# Patient Record
Sex: MALE | Race: WHITE | NOT HISPANIC OR LATINO | Employment: STUDENT | URBAN - METROPOLITAN AREA
[De-identification: names, ages, dates, MRNs, and addresses within clinical notes are randomized per-mention and may not be internally consistent; named-entity substitution may affect disease eponyms.]

---

## 2024-06-18 ENCOUNTER — OFFICE VISIT (OUTPATIENT)
Dept: PRIMARY CARE | Facility: CLINIC | Age: 19
End: 2024-06-18
Payer: COMMERCIAL

## 2024-06-18 VITALS
SYSTOLIC BLOOD PRESSURE: 115 MMHG | HEART RATE: 73 BPM | HEIGHT: 66 IN | OXYGEN SATURATION: 98 % | DIASTOLIC BLOOD PRESSURE: 84 MMHG | BODY MASS INDEX: 22.37 KG/M2 | TEMPERATURE: 97.2 F | WEIGHT: 139.2 LBS

## 2024-06-18 DIAGNOSIS — F90.8 ATTENTION DEFICIT HYPERACTIVITY DISORDER (ADHD), OTHER TYPE: ICD-10-CM

## 2024-06-18 DIAGNOSIS — Z00.00 ANNUAL PHYSICAL EXAM: Primary | ICD-10-CM

## 2024-06-18 DIAGNOSIS — Z79.899 MEDICATION MANAGEMENT: ICD-10-CM

## 2024-06-18 DIAGNOSIS — F41.9 ANXIETY: ICD-10-CM

## 2024-06-18 PROBLEM — F90.9 ADHD: Status: ACTIVE | Noted: 2024-06-18

## 2024-06-18 PROCEDURE — 99385 PREV VISIT NEW AGE 18-39: CPT | Performed by: NURSE PRACTITIONER

## 2024-06-18 PROCEDURE — 1036F TOBACCO NON-USER: CPT | Performed by: NURSE PRACTITIONER

## 2024-06-18 RX ORDER — HYDROXYZINE HYDROCHLORIDE 10 MG/1
10 TABLET, FILM COATED ORAL ONCE
COMMUNITY
Start: 2023-12-06 | End: 2024-06-18 | Stop reason: ALTCHOICE

## 2024-06-18 RX ORDER — TRETINOIN 0.5 MG/G
0.05 CREAM TOPICAL NIGHTLY
COMMUNITY
Start: 2024-02-27

## 2024-06-18 RX ORDER — DEXTROAMPHETAMINE SACCHARATE, AMPHETAMINE ASPARTATE MONOHYDRATE, DEXTROAMPHETAMINE SULFATE AND AMPHETAMINE SULFATE 5; 5; 5; 5 MG/1; MG/1; MG/1; MG/1
20 CAPSULE, EXTENDED RELEASE ORAL EVERY MORNING
Qty: 30 CAPSULE | Refills: 0 | Status: SHIPPED | OUTPATIENT
Start: 2024-06-18

## 2024-06-18 RX ORDER — DEXTROAMPHETAMINE SACCHARATE, AMPHETAMINE ASPARTATE MONOHYDRATE, DEXTROAMPHETAMINE SULFATE AND AMPHETAMINE SULFATE 5; 5; 5; 5 MG/1; MG/1; MG/1; MG/1
20 CAPSULE, EXTENDED RELEASE ORAL EVERY MORNING
COMMUNITY
Start: 2024-04-17 | End: 2024-06-18 | Stop reason: SDUPTHER

## 2024-06-18 RX ORDER — VARENICLINE TARTRATE 0.5 MG/1
0.5 TABLET, FILM COATED ORAL DAILY
COMMUNITY
Start: 2024-04-18 | End: 2024-06-18 | Stop reason: ALTCHOICE

## 2024-06-18 RX ORDER — DEXTROAMPHETAMINE SACCHARATE, AMPHETAMINE ASPARTATE MONOHYDRATE, DEXTROAMPHETAMINE SULFATE AND AMPHETAMINE SULFATE 5; 5; 5; 5 MG/1; MG/1; MG/1; MG/1
20 CAPSULE, EXTENDED RELEASE ORAL EVERY MORNING
Qty: 30 CAPSULE | Refills: 0 | Status: SHIPPED | OUTPATIENT
Start: 2024-07-18 | End: 2024-08-17

## 2024-06-18 ASSESSMENT — ENCOUNTER SYMPTOMS
NERVOUS/ANXIOUS: 0
EYE ITCHING: 0
HEMATURIA: 0
COUGH: 0
WOUND: 0
DYSURIA: 0
NUMBNESS: 0
WHEEZING: 0
ABDOMINAL DISTENTION: 0
ARTHRALGIAS: 0
DIARRHEA: 0
SHORTNESS OF BREATH: 0
APPETITE CHANGE: 0
PALPITATIONS: 0
FREQUENCY: 0
EYE PAIN: 0
VOMITING: 0
CONSTIPATION: 0
ACTIVITY CHANGE: 0
ABDOMINAL PAIN: 0

## 2024-06-18 ASSESSMENT — PATIENT HEALTH QUESTIONNAIRE - PHQ9
1. LITTLE INTEREST OR PLEASURE IN DOING THINGS: NOT AT ALL
2. FEELING DOWN, DEPRESSED OR HOPELESS: NOT AT ALL
SUM OF ALL RESPONSES TO PHQ9 QUESTIONS 1 AND 2: 0

## 2024-06-18 NOTE — PROGRESS NOTES
Hubert Shultz male   2005 18 y.o.   [id number]    Chief Complaint  New Patient Visit.    History Of Present Illness  Hubert Shultz is a 18 y.o. male presents today to establish care.     Acute concerns today: None    Chronic conditions reviewed:   Anxiety   Had tried hydroxyzine prn   Sertraline and wellbutrin- did not work out well.    Has tried therapy in past - not interested currently due to time constraints.   Not interested in trying medication at this time.      ADHD   Middle and high school then stopped for a while.   Restarted again 6 2023   Did follow w psychiatry - was seeing Mari[facility] at [facility],  then was switched to different provider but did not like them  Would like to have this provider prescribe it  Will set up agreement today     OARRS:  FREIDA Zee-CNP on 6/18/2024  9:46 AM  I have personally reviewed the OARRS report for Hubert Shultz. I have considered the risks of abuse, dependence, addiction and diversion    Is the patient prescribed a combination of a benzodiazepine and opioid?  No    Last Urine Drug Screen / ordered today: Yes  No results found for this or any previous visit (from the past 8760 hour(s)).    Controlled Substance Agreement:  Date of the Last Agreement: 6/18/2024  Reviewed Controlled Substance Agreement including but not limited to the benefits, risks, and alternatives to treatment with a Controlled Substance medication(s).    Stimulants:   What is the patient's goal of therapy? Helps w staying on task,   Is this being achieved with current treatment? Yes     Activities of Daily Living:   Is your overall impression that this patient is benefiting (symptom reduction outweighs side effects) from stimulant therapy? Yes     1. Physical Functioning: Same  2. Family Relationship: Same  3. Social Relationship: Same  4. Mood: Better  5. Sleep Patterns: Same  6. Overall Function: Better        Review of Systems  Review of Systems   Constitutional:  Negative for activity  change and appetite change.   HENT:  Negative for congestion.    Eyes:  Negative for pain and itching.   Respiratory:  Negative for cough, shortness of breath and wheezing.    Cardiovascular:  Negative for chest pain, palpitations and leg swelling.   Gastrointestinal:  Negative for abdominal distention, abdominal pain, constipation, diarrhea and vomiting.   Genitourinary:  Negative for dysuria, frequency, hematuria and urgency.   Musculoskeletal:  Negative for arthralgias and gait problem.   Skin:  Negative for rash and wound.        Vitiligo - sees dermatology - Dr Kiran walker    Neurological:  Negative for numbness.   Psychiatric/Behavioral:  The patient is not nervous/anxious.        Diet: Mostly out.  Fast food   Exercise: Does not exercise   Dental: Twice per year   Ophtho: No needs     Immunizations:   Flu-  recommended in fall   Tdap- would like to get at next visit   Covid- recommended at pharmacy       Past Medical History  He has a past medical history of ADHD (attention deficit hyperactivity disorder) and Anxiety.    Surgical History  He has no past surgical history on file.    Family History  Family History   Adopted: Yes        Social History  He reports that he has never smoked. He has never used smokeless tobacco. He reports that he does not currently use alcohol. He reports that he does not use drugs.    DEPRESSION SCREEN  Over the past 2 weeks, how often have you been bothered by any of the following problems?  Little interest or pleasure in doing things: Not at all  Feeling down, depressed, or hopeless: Not at all    Allergies  Patient has no known allergies.    Medications  Current Outpatient Medications   Medication Instructions    amphetamine-dextroamphetamine XR (Adderall XR) 20 mg 24 hr capsule 20 mg, oral, Every morning    [START ON 7/18/2024] amphetamine-dextroamphetamine XR (Adderall XR) 20 mg 24 hr capsule 20 mg, oral, Every morning    tretinoin (Retin-A) 0.05 % cream 0.05  "Applications, Topical, Nightly        Objective   /84 (BP Location: Left arm, Patient Position: Sitting, BP Cuff Size: Adult)   Pulse 73   Temp 36.2 °C (97.2 °F) (Temporal)   Ht 1.676 m (5' 6\")   Wt 63.1 kg (139 lb 3.2 oz)   SpO2 98%   BMI 22.47 kg/m²    BMI: Estimated body mass index is 22.47 kg/m² as calculated from the following:    Height as of this encounter: 1.676 m (5' 6\").    Weight as of this encounter: 63.1 kg (139 lb 3.2 oz).    Physical Exam  Physical Exam  Vitals and nursing note reviewed.   Constitutional:       Appearance: Normal appearance.   HENT:      Head: Normocephalic and atraumatic.      Nose: Nose normal.      Mouth/Throat:      Mouth: Mucous membranes are moist.      Pharynx: Oropharynx is clear.   Eyes:      Extraocular Movements: Extraocular movements intact.      Conjunctiva/sclera: Conjunctivae normal.      Pupils: Pupils are equal, round, and reactive to light.   Cardiovascular:      Rate and Rhythm: Normal rate and regular rhythm.      Pulses: Normal pulses.      Heart sounds: Normal heart sounds.   Pulmonary:      Effort: Pulmonary effort is normal.      Breath sounds: Normal breath sounds.   Abdominal:      General: Bowel sounds are normal.      Palpations: Abdomen is soft.      Tenderness: There is no abdominal tenderness.   Musculoskeletal:         General: Normal range of motion.      Cervical back: Neck supple.   Skin:     General: Skin is warm and dry.   Neurological:      General: No focal deficit present.      Mental Status: He is alert and oriented to person, place, and time. Mental status is at baseline.   Psychiatric:         Mood and Affect: Mood normal.         Behavior: Behavior normal.         Thought Content: Thought content normal.         Judgment: Judgment normal.         Relevant Results and Imaging  No visits with results within 1 Year(s) from this visit.   Latest known visit with results is:   No results found for any previous visit.     No images are " attached to the encounter.        Assessment and Plan  Assessment/Plan   Problem List Items Addressed This Visit             ICD-10-CM    Anxiety F41.9     Stable at this time  -Declines therapy at this time  -Not interested in medication at this time           ADHD F90.9     Managed on Adderall in middle and high school then stopped for a while.   Restarted again 6/2023 at [facility] psychiatry  -Would like this provider to prescribe from now on   -uds, csa established 6/18/24  -oarrs reviewed 6/18/24         Relevant Medications    amphetamine-dextroamphetamine XR (Adderall XR) 20 mg 24 hr capsule    amphetamine-dextroamphetamine XR (Adderall XR) 20 mg 24 hr capsule (Start on 7/18/2024)     Other Visit Diagnoses         Codes    Annual physical exam    -  Primary Z00.00    Relevant Orders    Comprehensive Metabolic Panel    Lipid Panel    Medication management     Z79.899    Relevant Orders    Drug Screen, Urine With Reflex to Confirmation             Health maintenance  - Aim for 30 minutes of aerobic exercise, 5 times per week. Try to cut back on processed foods, including foods made with flour, sugar, and added salt.    - Recommend flu vaccine in the fall  - Tdap at next visit  - COVID-vaccine at pharmacy

## 2024-06-19 ENCOUNTER — LAB (OUTPATIENT)
Dept: LAB | Facility: LAB | Age: 19
End: 2024-06-19
Payer: COMMERCIAL

## 2024-06-19 DIAGNOSIS — Z00.00 ANNUAL PHYSICAL EXAM: ICD-10-CM

## 2024-06-19 LAB
ALBUMIN SERPL BCP-MCNC: 4.6 G/DL (ref 3.4–5)
ALP SERPL-CCNC: 57 U/L (ref 33–120)
ALT SERPL W P-5'-P-CCNC: 12 U/L (ref 10–52)
ANION GAP SERPL CALC-SCNC: 12 MMOL/L (ref 10–20)
AST SERPL W P-5'-P-CCNC: 17 U/L (ref 9–39)
BILIRUB SERPL-MCNC: 0.5 MG/DL (ref 0–1.2)
BUN SERPL-MCNC: 8 MG/DL (ref 6–23)
CALCIUM SERPL-MCNC: 9.7 MG/DL (ref 8.6–10.6)
CHLORIDE SERPL-SCNC: 104 MMOL/L (ref 98–107)
CHOLEST SERPL-MCNC: 124 MG/DL (ref 0–199)
CHOLESTEROL/HDL RATIO: 2.5
CO2 SERPL-SCNC: 30 MMOL/L (ref 21–32)
CREAT SERPL-MCNC: 0.92 MG/DL (ref 0.5–1.3)
EGFRCR SERPLBLD CKD-EPI 2021: >90 ML/MIN/1.73M*2
GLUCOSE SERPL-MCNC: 75 MG/DL (ref 74–99)
HDLC SERPL-MCNC: 49.9 MG/DL
LDLC SERPL CALC-MCNC: 62 MG/DL
NON HDL CHOLESTEROL: 74 MG/DL (ref 0–119)
POTASSIUM SERPL-SCNC: 4.4 MMOL/L (ref 3.5–5.3)
PROT SERPL-MCNC: 6.6 G/DL (ref 6.4–8.2)
SODIUM SERPL-SCNC: 142 MMOL/L (ref 136–145)
TRIGL SERPL-MCNC: 60 MG/DL (ref 0–149)
VLDL: 12 MG/DL (ref 0–40)

## 2024-06-19 PROCEDURE — 80053 COMPREHEN METABOLIC PANEL: CPT

## 2024-06-19 PROCEDURE — 36415 COLL VENOUS BLD VENIPUNCTURE: CPT

## 2024-06-19 PROCEDURE — 80061 LIPID PANEL: CPT

## 2024-06-19 NOTE — ASSESSMENT & PLAN NOTE
Managed on Adderall in middle and high school then stopped for a while.   Restarted again 6/2023 at [facility] psychiatry  -Would like this provider to prescribe from now on   -uds, csa established 6/18/24  -oarrs reviewed 6/18/24

## 2024-06-25 ENCOUNTER — CLINICAL SUPPORT (OUTPATIENT)
Dept: PRIMARY CARE | Facility: CLINIC | Age: 19
End: 2024-06-25
Payer: COMMERCIAL

## 2024-06-25 DIAGNOSIS — Z79.899 MEDICATION MANAGEMENT: ICD-10-CM

## 2024-06-25 PROCEDURE — 80307 DRUG TEST PRSMV CHEM ANLYZR: CPT

## 2024-06-26 LAB
AMPHETAMINES UR QL SCN: NORMAL
BARBITURATES UR QL SCN: NORMAL
BENZODIAZ UR QL SCN: NORMAL
BZE UR QL SCN: NORMAL
CANNABINOIDS UR QL SCN: NORMAL
FENTANYL+NORFENTANYL UR QL SCN: NORMAL
METHADONE UR QL SCN: NORMAL
OPIATES UR QL SCN: NORMAL
OXYCODONE+OXYMORPHONE UR QL SCN: NORMAL
PCP UR QL SCN: NORMAL

## 2024-12-18 ENCOUNTER — APPOINTMENT (OUTPATIENT)
Dept: PRIMARY CARE | Facility: CLINIC | Age: 19
End: 2024-12-18
Payer: COMMERCIAL